# Patient Record
Sex: MALE | Race: OTHER | NOT HISPANIC OR LATINO | URBAN - METROPOLITAN AREA
[De-identification: names, ages, dates, MRNs, and addresses within clinical notes are randomized per-mention and may not be internally consistent; named-entity substitution may affect disease eponyms.]

---

## 2019-06-11 ENCOUNTER — EMERGENCY (EMERGENCY)
Facility: HOSPITAL | Age: 44
LOS: 1 days | Discharge: HOME | End: 2019-06-11
Admitting: EMERGENCY MEDICINE
Payer: MEDICARE

## 2019-06-11 VITALS
RESPIRATION RATE: 16 BRPM | WEIGHT: 134.92 LBS | SYSTOLIC BLOOD PRESSURE: 113 MMHG | TEMPERATURE: 98 F | HEART RATE: 95 BPM | OXYGEN SATURATION: 96 % | DIASTOLIC BLOOD PRESSURE: 72 MMHG

## 2019-06-11 DIAGNOSIS — Z76.0 ENCOUNTER FOR ISSUE OF REPEAT PRESCRIPTION: ICD-10-CM

## 2019-06-11 DIAGNOSIS — F20.9 SCHIZOPHRENIA, UNSPECIFIED: ICD-10-CM

## 2019-06-11 PROCEDURE — 99283 EMERGENCY DEPT VISIT LOW MDM: CPT

## 2019-06-11 RX ORDER — QUETIAPINE FUMARATE 200 MG/1
0 TABLET, FILM COATED ORAL
Qty: 0 | Refills: 0 | DISCHARGE

## 2019-06-11 RX ORDER — QUETIAPINE FUMARATE 200 MG/1
1 TABLET, FILM COATED ORAL
Qty: 10 | Refills: 0
Start: 2019-06-11

## 2019-06-11 RX ORDER — MIRTAZAPINE 45 MG/1
1 TABLET, ORALLY DISINTEGRATING ORAL
Qty: 10 | Refills: 0
Start: 2019-06-11

## 2019-06-11 RX ORDER — MIRTAZAPINE 45 MG/1
0 TABLET, ORALLY DISINTEGRATING ORAL
Qty: 0 | Refills: 0 | DISCHARGE

## 2019-06-11 NOTE — ED PROVIDER NOTE - CLINICAL SUMMARY MEDICAL DECISION MAKING FREE TEXT BOX
hx schizophrenia here for med refill, awaiting psych appointment, no SI/HI or hallucinations, not psychotic, will rx meds x 1 week, advised to f/u psychiatry

## 2019-06-11 NOTE — ED PROVIDER NOTE - NSFOLLOWUPINSTRUCTIONS_ED_ALL_ED_FT
PLEASE FOLLOW UP WITH PSYCHIATRIST WITHIN 2-3 DAYS  TAKE MEDICATIONS AS PRESCRIBED    RETURN TO THE EMERGENCY DEPARTMENT FOR ANY CONCERNING OR WORSENING SYMPTOMS INCLUDING THOUGHTS OF HARMING SELF OR OTHERS, DEPRESSION OR ANY CONCERNS.

## 2019-06-11 NOTE — ED PROVIDER NOTE - OBJECTIVE STATEMENT
45 y/o male with PMHx of diagnosis of schizophrenia and anxiety disorder presents to ED requesting medication refill. Patient reports he is in the process of changing to a new psychologist, went to [facility] and told he needs to make an appointment with the new psychologist. However, patient is unsure of when he will be able to f/u, and is requesting Rx for his meds. Patient takes Seroquel 200mg and Remeron 15mg daily. States he has been taking his meds, but ran out of his Seroquel, which he needs. Denies any SI, HI, or hallucinations. 45 y/o male with PMHx of diagnosis of schizophrenia and anxiety disorder presents to ED requesting medication refill. Patient reports he is in the process of changing to a new psychiatrist, awaiting for appointment in the [facility] to see a psychiatrist - requesting rx of psych meds - Patient states he takes Seroquel 200mg and Remeron 15mg daily, has been complaint. Denies any SI, HI, or hallucinations.

## 2019-06-11 NOTE — ED PROVIDER NOTE - PHYSICAL EXAMINATION
CONSTITUTIONAL: Well-developed; well-nourished; in no acute distress.  SKIN: Skin is warm and dry, no acute rash.  HEAD: Normocephalic; atraumatic.  EYES: PERRL, EOM intact; conjunctiva and sclera clear.  ENT: No nasal discharge; airway clear.  no tonsillar swelling or exudates, uvula midline, airway patent  NECK: Supple; non tender.  CARD: S1, S2 normal; no murmurs, gallops, or rubs. Regular rate and rhythm.  RESP: No wheezes, rales or rhonchi.  ABD: Normal bowel sounds; soft; non-distended; non-tender  EXT: Normal ROM. MAEx4.  NEURO: Alert, oriented. Grossly unremarkable.  PSYCH: Cooperative, appropriate. CONSTITUTIONAL: Well-developed; well-nourished; in no acute distress.  SKIN: Skin is warm and dry, no acute rash.  HEAD: Normocephalic; atraumatic.  EYES: PERRL, EOM intact; conjunctiva and sclera clear.  ENT: No nasal discharge; airway clear.  no tonsillar swelling or exudates, uvula midline, airway patent  NECK: Supple; non tender.  CARD: S1, S2 normal; no murmurs, gallops, or rubs. Regular rate and rhythm.  RESP: No wheezes, rales or rhonchi.  ABD: Normal bowel sounds; soft; non-distended; non-tender  EXT: Normal ROM x 4  NEURO: Alert, oriented. Grossly unremarkable.  PSYCH: Cooperative, appropriate.

## 2019-07-22 ENCOUNTER — EMERGENCY (EMERGENCY)
Facility: HOSPITAL | Age: 44
LOS: 1 days | Discharge: HOME | End: 2019-07-22
Admitting: EMERGENCY MEDICINE
Payer: MEDICARE

## 2019-07-22 VITALS
DIASTOLIC BLOOD PRESSURE: 88 MMHG | HEART RATE: 92 BPM | RESPIRATION RATE: 17 BRPM | OXYGEN SATURATION: 96 % | WEIGHT: 139.99 LBS | TEMPERATURE: 98 F | SYSTOLIC BLOOD PRESSURE: 135 MMHG

## 2019-07-22 DIAGNOSIS — F20.9 SCHIZOPHRENIA, UNSPECIFIED: ICD-10-CM

## 2019-07-22 DIAGNOSIS — Z76.0 ENCOUNTER FOR ISSUE OF REPEAT PRESCRIPTION: ICD-10-CM

## 2019-07-22 PROCEDURE — 99053 MED SERV 10PM-8AM 24 HR FAC: CPT

## 2019-07-22 PROCEDURE — 99283 EMERGENCY DEPT VISIT LOW MDM: CPT

## 2019-07-22 NOTE — ED PROVIDER NOTE - NSFOLLOWUPINSTRUCTIONS_ED_ALL_ED_FT
Please take medications as prescribed.     Follow up with psychiatry     Return to the Emergency Department for any concerns     YOU MAY ALSO CALL [phone] AND ASK FOR MS. DAO SIGALA.  SHE CAN HELP YOU MAKE A FOLLOW-UP APPOINTMENT.  HER HOURS ARE 11AM-7PM MONDAY - FRIDAY. ALTERNATIVELY YOU CAN CALL THE CALL CENTER AS SEEN ON THIS PAGE FOR FOLLOWUP

## 2019-07-22 NOTE — ED PROVIDER NOTE - CLINICAL SUMMARY MEDICAL DECISION MAKING FREE TEXT BOX
patient here for med refill of psych meds, will rx seroquel, provided list of psych outpatient facilities, also provided SW info to help assist with finding psychiatrist, no acute psychiatric conditions now

## 2019-07-22 NOTE — ED PROVIDER NOTE - OBJECTIVE STATEMENT
45 yo M hx schizophrenia and anxiety requesting medication refill of Seroquel as he is having trouble getting appointment with psych. denies SI/HI. has no other medical complaints. has been compliant with his medications.

## 2019-07-23 PROBLEM — F39 UNSPECIFIED MOOD [AFFECTIVE] DISORDER: Chronic | Status: ACTIVE | Noted: 2019-06-11

## 2019-07-23 PROBLEM — F20.9 SCHIZOPHRENIA, UNSPECIFIED: Chronic | Status: ACTIVE | Noted: 2019-06-11

## 2019-07-23 NOTE — ED ADULT NURSE NOTE - CHPI ED NUR SYMPTOMS NEG
no decreased eating/drinking/no pain/no nausea/no chills/no tingling/no weakness/no dizziness/no fever/no vomiting

## 2019-09-06 ENCOUNTER — EMERGENCY (EMERGENCY)
Facility: HOSPITAL | Age: 44
LOS: 1 days | Discharge: LEFT BEFORE DISCHARGE | End: 2019-09-06
Admitting: EMERGENCY MEDICINE

## 2019-09-06 VITALS
OXYGEN SATURATION: 100 % | RESPIRATION RATE: 16 BRPM | TEMPERATURE: 99 F | HEART RATE: 75 BPM | DIASTOLIC BLOOD PRESSURE: 86 MMHG | SYSTOLIC BLOOD PRESSURE: 121 MMHG

## 2019-09-06 NOTE — ED ADULT TRIAGE NOTE - CHIEF COMPLAINT QUOTE
from private residence "I'm having a lot of anxiety about my medical problems and personal issues." Patient states he has been walking around outside to calm himself and states he walked a long distance today from train station. States " I was supposed to schedule an appointment to see a psychiatrist but I haven't gotten a chance." Endorsing depression this week. Denies SI/HI, a/v hallucination. Denies

## 2020-06-23 ENCOUNTER — EMERGENCY (EMERGENCY)
Facility: HOSPITAL | Age: 45
LOS: 1 days | Discharge: HOME | End: 2020-06-23
Admitting: EMERGENCY MEDICINE
Payer: MEDICARE

## 2020-06-23 VITALS
DIASTOLIC BLOOD PRESSURE: 73 MMHG | TEMPERATURE: 98 F | WEIGHT: 139.99 LBS | OXYGEN SATURATION: 96 % | RESPIRATION RATE: 18 BRPM | HEART RATE: 85 BPM | SYSTOLIC BLOOD PRESSURE: 124 MMHG | HEIGHT: 65 IN

## 2020-06-23 PROCEDURE — 99283 EMERGENCY DEPT VISIT LOW MDM: CPT

## 2020-06-23 RX ORDER — QUETIAPINE FUMARATE 200 MG/1
50 TABLET, FILM COATED ORAL ONCE
Refills: 0 | Status: COMPLETED | OUTPATIENT
Start: 2020-06-23 | End: 2020-06-23

## 2020-06-23 RX ORDER — QUETIAPINE FUMARATE 200 MG/1
1 TABLET, FILM COATED ORAL
Qty: 30 | Refills: 0
Start: 2020-06-23 | End: 2020-07-22

## 2020-06-23 RX ORDER — RISPERIDONE 4 MG/1
1 TABLET ORAL
Qty: 60 | Refills: 0
Start: 2020-06-23 | End: 2020-07-22

## 2020-06-23 RX ADMIN — QUETIAPINE FUMARATE 50 MILLIGRAM(S): 200 TABLET, FILM COATED ORAL at 06:40

## 2020-06-23 NOTE — ED PROVIDER NOTE - OBJECTIVE STATEMENT
44 yo M with PMHx with PMHx of schizophrenia, anxiety, on Seroquel 50mg qhs, risperidone 0.5mg, zyprexa (unknown dose), presenting c/o insomnia and increased anxiety x 1 wk.  Pt reports loss of follow up with his psychiatric clinic due to COVID and has been scoring his meds for the past month to stretch them out, but ran out of all his psychiatric meds 6d ago. Has been having increased anxiety with insomnia.  Denies SI/HI/AH/VH/delusion, HA, dizziness, CP, SOB, palpitations, N/V/D/C, abdominal pain, change in urinary/bowel function, tremors, focal weakness, and fever/chills.  No illicit drug use noted.  Of note, pt also reports having chronic intermittent flareup of toothache due to prior root canals but hasn't been able to follow up due to COVID as well

## 2020-06-23 NOTE — ED PROVIDER NOTE - PHYSICAL EXAMINATION
Gen - WDWN M, NAD, comfortable and non-toxic appearing  Skin - warm, dry, intact   HEENT - AT/NC, poor dentition, no focal fluctuance or rash, o/p clear, airway patent, neck supple   CV - S1S2, R/R/R  Resp - CTAB, no r/r/w  GI - soft, ND, NT, no CVAT b/l   MS - w/w/p, no c/c/e  Psych - euphoric, normal speech and eye contact, judgement and insight intact, no SI/HI/AH/VH/delusion   Neuro - AxOx3, ambulatory without gait disturbance

## 2020-06-23 NOTE — ED PROVIDER NOTE - NSFOLLOWUPINSTRUCTIONS_ED_ALL_ED_FT
Follow up with your primary care doctor or clinics listed below if you do not have a doctor,    [address] [address]  To make an appointment, call [phone]    [facility]  Address: [facility] [address] [place] [id number]  Appointment Center: [phone] ([phone])     Return immediately for any new or worsening symptoms or any new concerns     Please follow-up at the [facility] on Monday morning at 830am.  The [facility] is located at [address] [place] [address] (corner of [facility]) in [place].  It is first come first serve so please arrive at 830am.

## 2020-06-23 NOTE — ED PROVIDER NOTE - CARE PROVIDER_API CALL
Rand Roberts)  Psychiatry  [address] [address]  Phone: [phone]  Fax: [phone]  Follow Up Time:

## 2020-06-23 NOTE — ED PROVIDER NOTE - PATIENT PORTAL LINK FT
You can access the FollowMyHealth Patient Portal offered by [facility] by registering at the following website: [email or web address]. By joining [facility] FollowMyHealth portal, you will also be able to view your health information using other applications (apps) compatible with our system.

## 2020-06-23 NOTE — ED ADULT TRIAGE NOTE - CHIEF COMPLAINT QUOTE
Pt walks in asking for medication refill. Pt is a schizophrenic and normally takes risperidone, Zyprexa, and Seroquel but ran out 6 days ago. Pt asking for refill of Seroquel. Pt c/o anxiety and not being able to sleep.

## 2020-06-23 NOTE — ED PROVIDER NOTE - CARE PLAN
Principal Discharge DX:	Medication refill  Secondary Diagnosis:	Dentalgia Principal Discharge DX:	Medication refill  Secondary Diagnosis:	Dentalgia  Secondary Diagnosis:	Anxiety

## 2020-06-23 NOTE — ED PROVIDER NOTE - CLINICAL SUMMARY MEDICAL DECISION MAKING FREE TEXT BOX
pt presenting for psych med refill. Medication bottles verified and no acute psychiatric instability noted, given Rx for seroquel and risperidone (unknown zyprexa dose), outpt psych and dental follow up information provided, encouraged prompt f/u, pt verbalized understanding

## 2020-06-23 NOTE — ED PROVIDER NOTE - NSFOLLOWUPCLINICS_GEN_ALL_ED_FT
[facility]  Psychiatry  [address] [place] [address]  Phone: [phone]  Fax:   Follow Up Time:

## 2020-06-27 DIAGNOSIS — K08.89 OTHER SPECIFIED DISORDERS OF TEETH AND SUPPORTING STRUCTURES: ICD-10-CM

## 2020-06-27 DIAGNOSIS — Z76.0 ENCOUNTER FOR ISSUE OF REPEAT PRESCRIPTION: ICD-10-CM

## 2020-06-27 DIAGNOSIS — F41.9 ANXIETY DISORDER, UNSPECIFIED: ICD-10-CM

## 2024-03-27 NOTE — ED ADULT TRIAGE NOTE - CADM TRG TX PRIOR TO ARRIVAL
Detail Level: Detailed
Add 71811 Cpt? (Important Note: In 2017 The Use Of 61747 Is Being Tracked By Cms To Determine Future Global Period Reimbursement For Global Periods): yes
none

## 2024-09-24 NOTE — ED PROVIDER NOTE - DISPOSITION TYPE
Left voice message to confirm apt location, time and date. Advised to arrive to appointment 15 minutes prior to appointment with completed paperwork. If pt didn't receive the paperwork they will be able to fill it out in the office at the time of arrival.  Advised to stop antihistamines such as Allegra, Claritin, Zyrtec, or Xyzal, and Astelin or Patanase 5 days prior to appointment. Advised to stop Benadryl and famotidine 24 hours prior to appointment.  Instructed to continue all other medications as prescribed, including inhalers. Provided patient with a call back number if they have any further questions.        DISCHARGE